# Patient Record
Sex: MALE | Race: WHITE
[De-identification: names, ages, dates, MRNs, and addresses within clinical notes are randomized per-mention and may not be internally consistent; named-entity substitution may affect disease eponyms.]

---

## 2017-08-22 NOTE — RAD
INDICATION: Chest pain



COMPARISON: Chest x-ray August 13, 2011

 

TECHNIQUE: Single AP portable view of the chest was obtained.



FINDINGS: 



Image quality is compromised due to the relative inferiority of a portable chest x-ray.



The heart and mediastinum exhibit normal size and contour.



The lungs are grossly clear. There is no evidence of a large pleural effusion.



Visualized bones are normal for the patient's age.



IMPRESSION:  No radiographic evidence for acute cardiopulmonary abnormality on this

portable chest x-ray.

## 2017-08-22 NOTE — RAD
INDICATION:  Headache and hypertension



COMPARISON: None.



TECHNIQUE: Contiguous axial sections of the brain were obtained from the skull base to the

vertex without contrast.



FINDINGS: The ventricles, cisterns and sulci are within normal limits.  The gray-white

matter differentiation is adequately maintained and there is no sulcal effacement. No

significant focal abnormality or mass effect is present. There is no evidence for

intracranial hemorrhage.



No significant focal osseous abnormality is present. 



The visualized portion of the paranasal sinuses and mastoid air cells appear clear.



IMPRESSION:  Normal CT of the brain.

## 2017-08-22 NOTE — ED
John MARIN Rebecca, scribed for William Varela MD on 08/22/17 at 1928 .





Hypertension





- HPI Summary


HPI Summary: 


Pt is a 51 y/o M who presents to ED c/o hypertension. When the pt took his BP 

PTA at [facility] it was 198/102. He then called his PCP and was advised to be 

evaluated by [facility] ED if his sx did not improve or experienced chest pressure. He 

then began experiencing chest pressure, which prompted his visit. He c/o 

bilateral LE heaviness, palpitations, chest pressure, fatigue (1 week) and 

feeling "off kilter." Pt reports that he has felt off balanced since waking up 

at approximately 0700 this morning. Denies SOB, abd pain, acute back pain, 

weakness and vision, speech or hearing changes. PMHx HTN. 





- History of Current Complaint


Chief Complaint: EDHypertension


Stated Complaint: HIGH BP


Time Seen by Provider: 08/22/17 19:20


Hx Obtained From: Patient


Onset/Duration: Still Present


Reported Blood Pressure Prior To Arrival: 198/102


Aggravating Factor(s): Nothing


Alleviating Factor(s): Nothing


Associated Signs & Symptoms: Other: - chest pressure, palpitations





- Allergies/Home Medications


Allergies/Adverse Reactions: 


 Allergies











Allergy/AdvReac Type Severity Reaction Status Date / Time


 


No Known Allergies Allergy   Verified 08/22/17 18:36














PMH/Surg Hx/FS Hx/Imm Hx


Endocrine/Hematology History: 


   Denies: Hx Diabetes, Hx Thyroid Disease


Cardiovascular History: Reports: Hx Hypertension


   Denies: Hx Pacemaker/ICD


Respiratory History: 


   Denies: Hx Asthma, Hx Chronic Obstructive Pulmonary Disease (COPD)


GI History: 


   Denies: Hx Ulcer


Sensory History: 


   Denies: Hx Hearing Aid


Neurological History: Reports: Hx Headaches


Psychiatric History: 


   Denies: Hx Panic Disorder





- Surgical History


Surgery Procedure, Year, and Place: hernia repair; vasectomy; wisdom teeth


Infectious Disease History: No


Infectious Disease History: 


   Denies: Hx Hepatitis, Hx Human Immunodeficiency Virus (HIV), History Other 

Infectious Disease, Traveled Outside the [place] in Last 30 Days





- Family History


Known Family History: Positive: Cardiac Disease, Hypertension, Diabetes





- Social History


Alcohol Use: Weekly


Substance Use Type: Reports: None


Smoking Status (MU): Former Smoker


Type: Cigarettes


Have You Smoked in the Last Year: No





Review of Systems


Positive: Fatigue, Other - Hypertensive - 198/102 PTA


Positive: Other - NEGATIVE: hearing changes


Positive: Other - NEGATIVE: hearing changes


Positive: Palpitations, Other - Chest pressure


Negative: Shortness Of Breath


Negative: Abdominal Pain


Positive: Other - Bilateral LE heaviness; NEGATIVE: acute back pain


Neurological: Other - Off balanced - "off kilter"; NEGATIVE: speech changes


Negative: Weakness


All Other Systems Reviewed And Are Negative: Yes





Physical Exam


Triage Information Reviewed: Yes


Vital Signs On Initial Exam: 


 Initial Vitals











Temp Pulse Resp BP Pulse Ox


 


 98.1 F   86   16   140/99   96 


 


 08/22/17 18:36  08/22/17 18:36  08/22/17 18:36  08/22/17 18:36  08/22/17 18:36











Appearance: Positive: Well-Appearing, No Pain Distress


Skin: Positive: Warm


Head/Face: Positive: Normal Head/Face Inspection


Eyes: Positive: EOMI


ENT: Positive: Normal ENT inspection


Respiratory/Lung Sounds: Positive: Clear to Auscultation, Breath Sounds Present


Cardiovascular: Positive: RRR.  Negative: Murmur


Abdomen Description: Positive: Nontender


Musculoskeletal: Positive: Strength/ROM Intact


Neurological: Positive: Sensory/Motor Intact, Alert, Oriented to Person Place, 

Time, CN Intact II-III


Psychiatric: Positive: Normal





- Harvey Coma Scale


Coma Scale Total: 15





Diagnostics





- Vital Signs


 Vital Signs











  Temp Pulse Resp BP Pulse Ox


 


 08/22/17 19:13  98.5 F  71  15  139/94  95


 


 08/22/17 19:00   66   133/91  


 


 08/22/17 18:47   72   153/91  


 


 08/22/17 18:46   74    96


 


 08/22/17 18:36  98.1 F  86  16  140/99  96














- Laboratory


Lab Results: 


 Lab Results











  08/22/17 08/22/17 08/22/17 Range/Units





  20:08 20:08 20:08 


 


WBC  6.5    (3.5-10.8)  10^3/ul


 


RBC  5.31    (4.0-5.4)  10^6/ul


 


Hgb  16.5    (14.0-18.0)  g/dl


 


Hct  47    (42-52)  %


 


MCV  89    (80-94)  fL


 


MCH  31    (27-31)  pg


 


MCHC  35    (31-36)  g/dl


 


RDW  13    (10.5-15)  %


 


Plt Count  180    (150-450)  10^3/ul


 


MPV  9    (7.4-10.4)  um3


 


Neut % (Auto)  59.0    (38-83)  %


 


Lymph % (Auto)  29.3    (25-47)  %


 


Mono % (Auto)  7.9    (1-9)  %


 


Eos % (Auto)  2.8    (0-6)  %


 


Baso % (Auto)  1.0    (0-2)  %


 


Absolute Neuts (auto)  3.8    (1.5-7.7)  10^3/ul


 


Absolute Lymphs (auto)  1.9    (1.0-4.8)  10^3/ul


 


Absolute Monos (auto)  0.5    (0-0.8)  10^3/ul


 


Absolute Eos (auto)  0.2    (0-0.6)  10^3/ul


 


Absolute Basos (auto)  0.1    (0-0.2)  10^3/ul


 


Absolute Nucleated RBC  0.01    10^3/ul


 


Nucleated RBC %  0.1    


 


INR (Anticoag Therapy)   0.94   (0.89-1.11)  


 


Sodium    140  (133-145)  mmol/L


 


Potassium    3.6  (3.5-5.0)  mmol/L


 


Chloride    107  (101-111)  mmol/L


 


Carbon Dioxide    26  (22-32)  mmol/L


 


Anion Gap    7  (2-11)  mmol/L


 


BUN    12  (6-24)  mg/dL


 


Creatinine    0.74  (0.67-1.17)  mg/dL


 


Est GFR ( Amer)    142.3  (>60)  


 


Est GFR (Non-Af Amer)    110.6  (>60)  


 


BUN/Creatinine Ratio    16.2  (8-20)  


 


Glucose    99  ()  mg/dL


 


Lactic Acid     (0.5-2.0)  mmol/L


 


Calcium    9.5  (8.6-10.3)  mg/dL


 


Total Bilirubin    0.80  (0.2-1.0)  mg/dL


 


AST    18  (13-39)  U/L


 


ALT    10  (7-52)  U/L


 


Alkaline Phosphatase    75  ()  U/L


 


Troponin I    0.00  (<0.04)  ng/mL


 


Total Protein    6.7  (6.4-8.9)  g/dL


 


Albumin    4.4  (3.2-5.2)  g/dL


 


Globulin    2.3  (2-4)  g/dL


 


Albumin/Globulin Ratio    1.9  (1-3)  














  08/22/17 Range/Units





  20:08 


 


WBC   (3.5-10.8)  10^3/ul


 


RBC   (4.0-5.4)  10^6/ul


 


Hgb   (14.0-18.0)  g/dl


 


Hct   (42-52)  %


 


MCV   (80-94)  fL


 


MCH   (27-31)  pg


 


MCHC   (31-36)  g/dl


 


RDW   (10.5-15)  %


 


Plt Count   (150-450)  10^3/ul


 


MPV   (7.4-10.4)  um3


 


Neut % (Auto)   (38-83)  %


 


Lymph % (Auto)   (25-47)  %


 


Mono % (Auto)   (1-9)  %


 


Eos % (Auto)   (0-6)  %


 


Baso % (Auto)   (0-2)  %


 


Absolute Neuts (auto)   (1.5-7.7)  10^3/ul


 


Absolute Lymphs (auto)   (1.0-4.8)  10^3/ul


 


Absolute Monos (auto)   (0-0.8)  10^3/ul


 


Absolute Eos (auto)   (0-0.6)  10^3/ul


 


Absolute Basos (auto)   (0-0.2)  10^3/ul


 


Absolute Nucleated RBC   10^3/ul


 


Nucleated RBC %   


 


INR (Anticoag Therapy)   (0.89-1.11)  


 


Sodium   (133-145)  mmol/L


 


Potassium   (3.5-5.0)  mmol/L


 


Chloride   (101-111)  mmol/L


 


Carbon Dioxide   (22-32)  mmol/L


 


Anion Gap   (2-11)  mmol/L


 


BUN   (6-24)  mg/dL


 


Creatinine   (0.67-1.17)  mg/dL


 


Est GFR ( Amer)   (>60)  


 


Est GFR (Non-Af Amer)   (>60)  


 


BUN/Creatinine Ratio   (8-20)  


 


Glucose   ()  mg/dL


 


Lactic Acid  0.6  (0.5-2.0)  mmol/L


 


Calcium   (8.6-10.3)  mg/dL


 


Total Bilirubin   (0.2-1.0)  mg/dL


 


AST   (13-39)  U/L


 


ALT   (7-52)  U/L


 


Alkaline Phosphatase   ()  U/L


 


Troponin I   (<0.04)  ng/mL


 


Total Protein   (6.4-8.9)  g/dL


 


Albumin   (3.2-5.2)  g/dL


 


Globulin   (2-4)  g/dL


 


Albumin/Globulin Ratio   (1-3)  











Result Diagrams: 


 08/22/17 20:08





 08/22/17 20:08


Lab Statement: Any lab studies that have been ordered have been reviewed, and 

results considered in the medical decision making process.





- Radiology


  ** CXR


Xray Interpretation: No Acute Changes - No radiographic evidence for acute 

cardiopulmonary abnormality on this portable chest x-ray.


Radiology Interpretation Completed By: Radiologist





- CT


  ** Brain CT


CT Interpretation: No Acute Changes - Normal CT of the brain.


CT Interpretation Completed By: Radiologist





- EKG


  ** 1851


Cardiac Rate: NL - 74 bpm


EKG Rhythm: Sinus Rhythm


EKG Interpretation: No acute changes





Re-Evaluation





- Re-Evaluation


  ** First Eval


Re-Evaluation Time: 20:19


Comment: Pt is doing well, reports a very slight pressure in the chest.





Hypertension Course/Dx





- Course


Course Of Treatment: 52 yr old male with chest pain and elevated bp.  DW Dr Frankenberg. Admit for further eval obs.


Assessment/Plan: Elevated BP noted and advised to f/u with PCP.





- Diagnoses


Provider Diagnoses: 


 Chest pain








- Physician Notifications


Discussed Care Of Patient With: Fred Frankenberg


Time Discussed With Above Provider: 20:48


Instructed by Provider To: Other - Accepts pt for admission.





Discharge





- Discharge Plan


Condition: Good


Disposition: ADMITTED TO [facility]





The documentation as recorded by the John do Rebecca accurately 

reflects the service I personally performed and the decisions made by me, William Varela MD.

## 2017-08-23 NOTE — RAD
Edited for charges.



INDICATION: Chest pain.



COMPARISON: No relevant prior exams available on the [facility] PACS for comparison.



TECHNIQUE: 10.860 mCi of Tc-99m Myoview were administered IV. SPECT images of 
the heart

were obtained.



Later on the same day. Under the direction of Dr. Omer, an exercise stress 
test was

performed. The patient achieved a peak heart rate of 150 bpm, 90 % of the age-
predicted

maximum. Subsequently, the patient was given an IV injection of 25.800 mCi Tc-
99m Myoview.

SPECT images of the heart were obtained and a gated wall motion study was 
performed.



FINDINGS:  Gated wall motion images were obtained at stress and demonstrate 
very mild

septal and inferior wall hypokinesia.



The calculated left ventricular ejection fraction is 58 % at stress. Estimated 
LEFT

ventricular end diastolic volume is 85 mL. TID 0.92.   



Diaphragmatic attenuation noted. Based on review of the attenuation corrected 
and non

corrected images the distribution  of radiopharmaceutical within the myocardium 
on the

stress and rest images is within normal limits. No fixed or reversible regions 
of

hypoperfusion evident.



IMPRESSION: 

1. Very mild septal and inferior wall hypokinesia. The LEFT ventricular 
ejection fraction

remains within normal range.

2. No compelling evidence for stress-induced ischemia or presence of an infarct.



ASSESSMENT:  LOW RISK.



Based on imaging criteria from ACC/AHA 2002 Guideline Update for the Management 
of

Patients With Chronic Stable Angina Table 23. Noninvasive Risk Stratification.



MTDD

## 2017-08-23 NOTE — HP
HISTORY AND PHYSICAL:

 

DATE OF ADMISSION:  08/22/17

 

PRIMARY CARE PROVIDER:  None.

 

ATTENDING PHYSICIAN WHILE IN THE HOSPITAL:  Dr. Fred Frankenberg * (report 
dictated by Nikita Watson NP).

 

CHIEF COMPLAINT:

1.  Chest pain

2.  Elevated blood pressure.

 

HISTORY OF PRESENT ILLNESS:  Mr. Lucero is a 52-year-old male patient who has 
a history of recent diagnosis of hypertension, ADHD, and history of chronic 
back pain.  He comes in, he says he noticed in June in his urology appointment 
his blood pressures were in the 170s.  He wished to seek follow up with his 
primary; unfortunately, his primary retired and he had not had a chance to seek 
care with a new primary.  He was at [facility] today at the pharmacy, he thought 
he would check his blood pressure and it was noted to be 198/107.  He called 
[facility] to try to establish today.  They actually got him an appointment for 
tomorrow and the patient was instructed that should he start having any other 
discomfort, headache, chest pain, he should be evaluated immediately at the ER.
  Around 3 o'clock today, he started having some chest pressure.  He described 
it as tightness, someone sitting on his chest that lasted several minutes.  He 
was concerned and he started getting headache.  He came into the ER to be 
evaluated for this.  He denies having any recent trips or travel.  No calf pain
, leg pain.  No swelling or tenderness.  He denied having any associated nausea 
or vomiting, no diaphoresis.  He says that the headache is now gone and the 
chest pain is now gone that his blood pressure is little bit better controlled.
  He says he has not been having any other symptoms, no trouble or difficulty 
with urination.  He said that he has had heaviness with exertion.  He came in, 
he was evaluated, it was noted that he has a former history of smoking.  In 
addition to this, he had a family history, so we were asked to evaluate for 
admission.

 

PAST MEDICAL HISTORY:  Significant for:

1.  Hypertension.

2.  ADHD.

3.  Chronic back pain.

 

PAST SURGICAL HISTORY:  He has had:

1.  Hernia repair.

2.  Vasectomy.

 

MEDICATIONS:  His home meds according to the list include:

1.  Klonopin 0.5 mg p.o. daily as needed.

2.  Oxcarbazepine 600 mg p.o. b.i.d.

 

ALLERGIES TO MEDICATIONS:  Include no known drug allergies.

 

FAMILY HISTORY:  Mother had a history of MI, CAD, CVA, and hyperlipidemia.  The 
father had history of cancer.

 

SOCIAL HISTORY:  He is a former smoker, rarely drinks alcohol.  His surrogate 
decision maker is his significant other, Sangita.

 

REVIEW OF SYSTEMS:  There is no documented fever.  He denied having any 
significant weight change.  There was no double vision.  He denies having any 
ear discharge. There is no rhinorrhea.  There is no sore throat.  No thyroid 
enlargement.  He denies chest pain currently.  He did have some from my HPI.  
No shortness of breath, no orthopnea, no nocturnal dyspnea.  There was no 
abdominal pain.  No nausea, no vomiting.  No dysuria, no frequency.  There was 
no loss of consciousness.  No pruritus and no skin ulcerations.  Review of 14 
systems completed, all others negative.

 

                               PHYSICAL EXAMINATION

 

GENERAL:  At this time, Mr. Lucero is a 52-year-old male patient.  He appears 
to be well nourished, well developed.  He does not appear to be in any acute 
distress.

 

VITAL SIGNS:  Reveal blood pressure 137/88, pulse 61, respirations 15, O2 sat 95
%, temperature 98.5.

 

HEENT:  Head:  Atraumatic, normocephalic.  Eyes:  EOMs are intact.  Sclerae 
anicteric.  Throat:  Oral mucosa appears to be moist.  No oropharyngeal 
erythema.

 

NECK:  Supple.

 

LUNGS:  Clear to auscultation.  No wheezes, rales, or rhonchi.

 

HEART:  Sounds S1, S2.  Regular rate and rhythm.  No murmurs, rubs, or gallops.

 

ABDOMEN:  Soft, flat, nontender.  Bowel sounds present.

 

EXTREMITIES:  Pulses 2+ throughout.  Able to move all 4 extremities with 5/5 
strength.

 

NEUROLOGIC:  The patient is awake, alert, and oriented x3.  Tongue midline.  
 equal.  No gross focal deficits.

 

SKIN:  Grossly intact.

 

 DIAGNOSTIC STUDIES/LAB DATA:  Labs revealed WBC 6.5, RBC of 5.31, hemoglobin 
16.5, hematocrit 47, platelet count 180.  INR 0.94.  Sodium 140, potassium 3.6, 
chloride of 107, bicarb 26, BUN 12, creatinine 0.74, glucose 99, lactate 0.6, 
calcium 9.5. Total bili 0.8, AST 18, ALT 10, alk phos 75.  Troponin 0.  Albumin 
4.4.

 

He did have an EKG obtained today, which showed normal sinus rhythm with a rate 
of 74.  No ST elevations or T-wave inversions were noted.

 

He had a chest x-ray obtained today, which showed no radiographic evidence of 
acute cardiopulmonary abnormality on this portable x-ray.

 

Brain CT showed a normal CT of the brain.

 

EKG is reviewed.  His previous EKG does appear to be similar.  Old medical 
records reviewed.

 

ASSESSMENT AND PLAN:  Mr. Lucero is a 52-year-old male patient coming into 
the ER today with complaints of chest discomfort, not feeling well, and having 
elevated blood pressure.  He will be admitted under observation status for:

 

1.  Chest pain.  At this point, he does have significant risk factors for 
coronary artery disease.  My plan is, I will go ahead and give him an aspirin.  
His chest pain is gone now.  I am actually going to remove his nitro paste; if 
it comes back, we will reapply the paste.  He did get a beta blocker here in 
ED.  We will continue to monitor him, cycle his troponins, we will check a 
stress test in the morning, place him on telemetry, serial EKGs, check an A1c, 
and lipid panel in the morning.

2.  Hypertension.  It is not well controlled at this point.  He will need a 
primary to follow.  I think starting him on Norvasc will be a good choice, as 
he is very active, I do not want to start him on a beta blocker.  Could 
consider hydrochlorothiazide, but again I am going to stick with Norvasc at 
this point for the patient and start this tomorrow and I will continue to 
monitor.

3.  Attention deficit hyperactivity disorder.  Continue meds as prescribed.

4.  Chronic back pain.  Continue meds as prescribed.  P.r.n. Tylenol has been 
ordered.

5.  DVT prophylaxis.  He is lower risk.  He will be placed on SCDs.

6.  Code status.  He is full code.

7.  Fluids, electrolytes, and nutrition.  Heart healthy diet and n.p.o. after 
midnight.

 

TIME SPENT:  On the admission was approximately 60 minutes, greater than half 
the time was spent face-to-face with the patient obtaining my history and 
physical; the other half time was spent going over the plan of care with the 
patient and implementing the plan of care.

 

I did discuss the plan of care with my attending, Dr. Frankenberg; he is in 
agreement.

 

 ____________________________________ NIKITA WATSON, NATHALY

 

[id number]/CPS #: [id number]

[facility]

## 2017-08-23 NOTE — DCNOTE
Subjective


Date of Service: 08/23/17


Interval History: 





No more chest discomfort.  He usually walks for an hour a few times a week, 

never had chest discomfort while walking.  Quit smoking 25 yrs ago.  





Objective


Active Medications: 








Acetaminophen (Tylenol Tab*)  650 mg PO Q4H PRN


   PRN Reason: FEVER/PAIN


Aspirin (Aspirin Low Dose Tab*)  81 mg PO DAILY [facility]


   Last Admin: 08/23/17 13:54 Dose:  81 mg


Ondansetron HCl (Zofran Inj*)  4 mg IV Q6H PRN


   PRN Reason: NAUSEA


Oxcarbazepine (Trileptal Tab(*))  600 mg PO BID [facility]


   Last Admin: 08/23/17 13:54 Dose:  600 mg








 Vital Signs











  08/22/17 08/22/17 08/23/17





  21:30 22:07 00:09


 


Temperature  98.4 F 97.9 F


 


Pulse Rate 66 58 62


 


Respiratory 16 18 16





Rate   


 


Blood Pressure 132/82 126/89 126/76





(mmHg)   


 


O2 Sat by Pulse 93 94 98





Oximetry   














  08/23/17 08/23/17 08/23/17





  04:06 07:33 11:11


 


Temperature 98.1 F 98.3 F 98.2 F


 


Pulse Rate 59 62 51


 


Respiratory 16 16 16





Rate   


 


Blood Pressure 127/75 137/83 122/75





(mmHg)   


 


O2 Sat by Pulse 97 95 96





Oximetry   











Appearance: Alert, sitting on the edge of his bed.  In good spirits.  Looks 

comfortable.


Neck: NL Appearance and Movements; NL JVP, No Thyroid Enlargement, Masses


Extremities: No Edema, No Clubbing, Cyanosis, -


Skin: No Rash or Ulcers, No Nodules or Sclerosis, -


Neurological: Alert and Oriented x 3, NL Sensation


Result Diagrams: 


 08/23/17 04:41





 08/23/17 04:41


Additional Lab and Data: 


 Lab Results











  08/22/17 08/22/17 08/22/17 Range/Units





  20:08 20:08 20:08 


 


WBC  6.5    (3.5-10.8)  10^3/ul


 


RBC  5.31    (4.0-5.4)  10^6/ul


 


Hgb  16.5    (14.0-18.0)  g/dl


 


Hct  47    (42-52)  %


 


MCV  89    (80-94)  fL


 


MCH  31    (27-31)  pg


 


MCHC  35    (31-36)  g/dl


 


RDW  13    (10.5-15)  %


 


Plt Count  180    (150-450)  10^3/ul


 


MPV  9    (7.4-10.4)  um3


 


Neut % (Auto)  59.0    (38-83)  %


 


Lymph % (Auto)  29.3    (25-47)  %


 


Mono % (Auto)  7.9    (1-9)  %


 


Eos % (Auto)  2.8    (0-6)  %


 


Baso % (Auto)  1.0    (0-2)  %


 


Absolute Neuts (auto)  3.8    (1.5-7.7)  10^3/ul


 


Absolute Lymphs (auto)  1.9    (1.0-4.8)  10^3/ul


 


Absolute Monos (auto)  0.5    (0-0.8)  10^3/ul


 


Absolute Eos (auto)  0.2    (0-0.6)  10^3/ul


 


Absolute Basos (auto)  0.1    (0-0.2)  10^3/ul


 


Absolute Nucleated RBC  0.01    10^3/ul


 


Nucleated RBC %  0.1    


 


INR (Anticoag Therapy)   0.94   (0.89-1.11)  


 


Sodium    140  (133-145)  mmol/L


 


Potassium    3.6  (3.5-5.0)  mmol/L


 


Chloride    107  (101-111)  mmol/L


 


Carbon Dioxide    26  (22-32)  mmol/L


 


Anion Gap    7  (2-11)  mmol/L


 


BUN    12  (6-24)  mg/dL


 


Creatinine    0.74  (0.67-1.17)  mg/dL


 


Est GFR ( Amer)    142.3  (>60)  


 


Est GFR (Non-Af Amer)    110.6  (>60)  


 


BUN/Creatinine Ratio    16.2  (8-20)  


 


Glucose    99  ()  mg/dL


 


Lactic Acid     (0.5-2.0)  mmol/L


 


Calcium    9.5  (8.6-10.3)  mg/dL


 


Total Bilirubin    0.80  (0.2-1.0)  mg/dL


 


AST    18  (13-39)  U/L


 


ALT    10  (7-52)  U/L


 


Alkaline Phosphatase    75  ()  U/L


 


Troponin I    0.00  (<0.04)  ng/mL


 


Total Protein    6.7  (6.4-8.9)  g/dL


 


Albumin    4.4  (3.2-5.2)  g/dL


 


Globulin    2.3  (2-4)  g/dL


 


Albumin/Globulin Ratio    1.9  (1-3)  














  08/22/17 Range/Units





  20:08 


 


WBC   (3.5-10.8)  10^3/ul


 


RBC   (4.0-5.4)  10^6/ul


 


Hgb   (14.0-18.0)  g/dl


 


Hct   (42-52)  %


 


MCV   (80-94)  fL


 


MCH   (27-31)  pg


 


MCHC   (31-36)  g/dl


 


RDW   (10.5-15)  %


 


Plt Count   (150-450)  10^3/ul


 


MPV   (7.4-10.4)  um3


 


Neut % (Auto)   (38-83)  %


 


Lymph % (Auto)   (25-47)  %


 


Mono % (Auto)   (1-9)  %


 


Eos % (Auto)   (0-6)  %


 


Baso % (Auto)   (0-2)  %


 


Absolute Neuts (auto)   (1.5-7.7)  10^3/ul


 


Absolute Lymphs (auto)   (1.0-4.8)  10^3/ul


 


Absolute Monos (auto)   (0-0.8)  10^3/ul


 


Absolute Eos (auto)   (0-0.6)  10^3/ul


 


Absolute Basos (auto)   (0-0.2)  10^3/ul


 


Absolute Nucleated RBC   10^3/ul


 


Nucleated RBC %   


 


INR (Anticoag Therapy)   (0.89-1.11)  


 


Sodium   (133-145)  mmol/L


 


Potassium   (3.5-5.0)  mmol/L


 


Chloride   (101-111)  mmol/L


 


Carbon Dioxide   (22-32)  mmol/L


 


Anion Gap   (2-11)  mmol/L


 


BUN   (6-24)  mg/dL


 


Creatinine   (0.67-1.17)  mg/dL


 


Est GFR ( Amer)   (>60)  


 


Est GFR (Non-Af Amer)   (>60)  


 


BUN/Creatinine Ratio   (8-20)  


 


Glucose   ()  mg/dL


 


Lactic Acid  0.6  (0.5-2.0)  mmol/L


 


Calcium   (8.6-10.3)  mg/dL


 


Total Bilirubin   (0.2-1.0)  mg/dL


 


AST   (13-39)  U/L


 


ALT   (7-52)  U/L


 


Alkaline Phosphatase   ()  U/L


 


Troponin I   (<0.04)  ng/mL


 


Total Protein   (6.4-8.9)  g/dL


 


Albumin   (3.2-5.2)  g/dL


 


Globulin   (2-4)  g/dL


 


Albumin/Globulin Ratio   (1-3)  














Assess/Plan/Problems-Billing


Assessment: 











- Patient Problems


(1) Atypical chest pain


Current Visit: Yes   Status: Acute   Code(s): R07.89 - OTHER CHEST PAIN   

SNOMED Code(s): 615746253


   Comment: Patient to followup with new PCP.  I explained the process of 

evaluating an outpatient for the diagnosis of hypertension.  He prefers to wait 

on anti-hypertensive medicine until a definitive diagnosis is made.    





(2) ADHD


Current Visit: Yes   Status: Acute   Comment: Continue home med.

## 2017-08-24 NOTE — DS
CC:  Dr. Santiago *

 

DISCHARGE SUMMARY:

 

DATE OF ADMISSION:

 

DATE OF DISCHARGE:  08/23/17

 

HISTORY OF PRESENT ILLNESS: This 52-year-old man presented with atypical chest 
pain and elevated blood pressure.  He had a feeling of malaise the day of 
admission.  Recently had been for routine visit at the urologist office and was 
told his blood pressure was high, he did not have the number.  He said all his 
life, whenever he had been to a doctor's office, says his blood pressure has 
always been good.  As mentioned above, the patient had some malaise.  He went 
to [facility] to get his prescription for his usual ADHD medicine.  He has blood 
pressure measured there and got blood pressure of 198/107.  According to the 
record here, his blood pressure may have been in the 170s in June in the 
Urology office.  He was advised over the phone that if he had headache or chest 
pain, he should go to the emergency room.  About few hours after that, he 
developed headache and chest pressure and went to the emergency room.

 

He was monitored on the telemetry unit.  His EKG was within normal limits.  He 
had 3 troponin levels, all of which were normal.  He underwent a nuclear stress 
test on the day of discharge.  This showed a normal left ventricular ejection 
fraction and no fixed or reversible areas of hypoperfusion.  He had no further 
symptoms in the hospital.  He did get doses of oral metoprolol and some nitro 
paste in the emergency room.  His blood pressure the second hospital morning 
was 137/83.  At 11 o'clock, it was 122/75.  Following that, he had a stress 
test at which time he says his blood pressures were little high and following 
that on return to the medical floor, he received amlodipine 5 mg.

 

I discussed with the patient the process of evaluating in outpatient for the 
diagnosis of hypertension.  He prefers not to take any antihypertensive 
medication until a definitive diagnosis has been made.  He has an appointment 
with Dr. Santiago on 09/08/17, which we made. 



FINAL DIAGNOSES:

1.  Atypical chest pain.

2.  Attention deficit hyperactivity disorder.

 

DISCHARGE MEDICATIONS:

1.  Oxcarbazepine 600 mg b.i.d.

2.  Klonopin 0.5 mg p.r.n.

 

 [id number]/[facility] #: [id number]

[facility]

## 2017-12-24 NOTE — RAD
Indication: Shortness of breath.



2 views of the chest including dual energy PA views demonstrate no mediastinal shift.

Heart is of normal size and configuration. Lung fields are clear.



IMPRESSION: No active cardiopulmonary disease is noted.

## 2017-12-24 NOTE — RAD
Indication: Hypoxia, shortness of breath.



Contrast: Administered 82.1 ml of OMNIPAQUE 350 mg/ml



CTA of the chest was performed after IV contrast administration. Coronal and sagittal

reconstructed images were obtained.



The pulmonary arterial tree demonstrates no evidence of filling defects to suggest

pulmonary embolus. The aorta demonstrates no evidence of aortic dissection.



No aneurysmal dilatation is noted. There is no mediastinal or hilar adenopathy.



The trachea and major bronchi appear patent. There is airspace disease in the right

suprahilar area of the right lower lobe which may represent pneumonia. Consolidation is

noted. No focal nodules are identified.



The visualized abdominal organs are otherwise unremarkable.



IMPRESSION: No definite evidence of pulmonary embolus is noted.



Area of consolidation in the right upper lobe posteriorly.

## 2017-12-25 NOTE — PN
Progress Note





- Progress Note


Date of Service: 12/25/17


Note: 





Discharge Progress Note





Primary diagnosis:


right upper lobe pneumonia





Secondary diagnoses:


hypertension


ADHD


chronic neck/back pain





Procedures:


none





Consultations:


none





Pertinent laboratory/radiology testing:


CTA chest: small RUL infiltrate





Microbiology





12/24/17 17:36   Urine   Legionella Urinary Antigen - Final


12/24/17 17:36   Urine   Streptococcus pneumoniae Ag Screen - Final


                              Negative Legionella


                              Negative S. pneumo Antigen





 Laboratory Tests











  12/24/17





  17:19


 


Influenza A (Rapid)  Negative











Tests pending upon discharge:


none





Physical exam: 











  12/25/17 12/25/17





  07:27 08:48


 


Temperature 36.8 C 


 


Pulse Rate 80 


 


Respiratory 16 





Rate  


 


Blood Pressure 129/90 





(mmHg)  


 


O2 Sat by Pulse 95 93





Oximetry

## 2017-12-25 NOTE — HP
CC:  Dr. Paige *

 

HISTORY AND PHYSICAL:

 

DATE OF ADMISSION:  12/24/17

 

PRIMARY CARE PROVIDER:  Dr. Paige.

 

ATTENDING PHYSICIAN WHILE IN THE HOSPITAL:  Dr. Joaquin Matos * (report 
dictated by Nikita Lindsay NP)

 

CHIEF COMPLAINT:

1.  Cough.

2.  Shortness of breath.

3.  Not feeling well.

 

HISTORY OF PRESENT ILLNESS:  Mr. Lucero is a 52-year-old male patient who 
comes into the ED today.  He says that since Thursday last week, he just has 
not been feeling well.  He has had a cough, productive of yellow-type sputum.  
He says when he lies flat at night, he has been feeling more short of breath, 
has been waking up at the middle of the night gasping for air.  He says that he 
has been tired, weak, fatigued feeling.  He says had kind of aching all over 
and feeling nauseous.  He also has been feeling dizzy.  He has had decreased 
appetite.  Denied any nausea, vomiting.  He went to his primary, he was started 
on azithromycin.  He was not feeling better.  He called his primary today and 
they referred him to the ER to get a further imaging with an x-ray.  He denies 
having any chest pain with the exception when he is coughing.  He denies having 
any reported fevers, but he does say that he has been feeling chills.  He says 
that he just was not getting any better, so he came in today.  He was evaluated 
here in the ED.  It was noted his O2 saturations were right around 92%.  There 
was concern that he may have bronchitis and it was noted that he was wheezing 
on exam, so we were asked to evaluate for admission.

 

PAST MEDICAL HISTORY:  Significant for:

1.  Hypertension.

2.  ADHD.

3.  Chronic back pain.

 

PAST SURGICAL HISTORY:  He has had:

1.  Hernia repair.

2.  Vasectomy.

 

MEDICATIONS:  Home meds include:

1.  Oxcarbazepine 600 mg p.o. b.i.d.

2.  Klonopin 0.5 mg p.o. daily as needed.

 

ALLERGIES TO MEDICATIONS:  No known drug allergies.

 

FAMILY HISTORY:  Mother had a history of MI, CAD, and CVA.  Father had history 
of cancer.

 

SOCIAL HISTORY:  He is a former smoker, he quit 35 years ago.  Rarely drinks 
alcohol.  His surrogate decision maker is his girlfriend, Sangita.

 

REVIEW OF SYSTEMS:  There is no documented fever here.  He has _____ to having 
chills.  He denies have any double vision.  There is no ear discharge.  There 
was some rhinorrhea.  Denies any congestion.  He does admit to having a cough.  
He does state that he has been aching.  He does admit to feeling nauseous now.  
Denies having any abdominal pain.  Denies having any loss of consciousness.  No 
pruritus and no skin ulcerations.  Review of 14 systems completed, all others 
negative.

 

                               PHYSICAL EXAMINATION

 

GENERAL:  At this time, Mr. Lucero is a 52-year-old male patient.  He appears 
to be well nourished, well developed.  He is sitting in the ED stretcher.  He 
does not appear to be in any acute distress.

 

VITAL SIGNS:  Blood pressure 152/91, pulse 82, respirations 18, O2 sat 94%, 
temperature 99.

 

HEENT:  Head:  Atraumatic, normocephalic.  Eyes:  EOMs are intact.  Sclerae 
anicteric, not pale.  Throat:  Oral mucosa appears to be moist.  No 
oropharyngeal erythema.

 

NECK:  Supple.

 

LUNGS:  He did have some wheezing noted in the lower base, particularly on the 
right side.  He did have rhonchi in the upper lobes.  Equal diaphragmatic 
expansion.

 

HEART:  Sounds S1, S2.  Regular rate and rhythm.  No murmurs, rubs, or gallops.

 

ABDOMEN:  Soft, flat, nontender.  Bowel sounds present.

 

EXTREMITIES:  Pulses 2+ throughout.  No peripheral edema.  He is moving all 4 
extremities with 5/5 strength.

 

NEUROLOGIC:  He is awake, alert, oriented x3.  No gross focal deficits.

 

SKIN:  Intact.

 

 DIAGNOSTIC STUDIES/LAB DATA:  WBC 5.4, RBC of 5.09, hemoglobin 16.0, 
hematocrit 45, platelet count 187.  Sodium 135, potassium 3.9, chloride 103, 
bicarb 24, BUN 8, creatinine 0.77, glucose 159, lactate 1.1, calcium 9.  Total 
bili 1.1, AST 20, ALT 10, alk phos 82.  Troponin 0.

 

He did have an EKG obtained today, showed normal sinus rhythm, rate of 80.  No 
ST-T elevations or T-wave inversions were noted.

 

Chest x-ray obtained today, impression:  No active cardiopulmonary disease.  
Old medical records reviewed.

 

ASSESSMENT AND PLAN:  Mr. Lucero is a 52-year-old coming into the ER today 
with complaints of upper respiratory infection symptoms.  We were asked to 
evaluate for admission.  He will be admitted under observations status for:

 

1.  Upper respiratory infection.  Suspect he probably has a viral bronchitis.  
His flu swab is pending.  It was sent down by the ED, just came back.  It is 
negative. Flu is negative.  The patient was noted to be little hypoxic where 
his O2 sats were right around 90% on room air.  He was not feeling well.  He 
started getting dizzy down here, feeling nauseous again after treatment, and we 
were asked to evaluate. I think he does _____ for the bronchitis.  I think we 
will go ahead and give him fluids, nebs, steroids, put him on azithromycin.  We 
will continue that antibiotics for now that was started outpatient and we will 
continue with supportive care.  He does report having an exposure to asbestos 
with his job, so I am going to get a CTA of the chest and will continue to 
follow him.

2.  Hypertension.  Continue his current medical regimen.  His blood pressure is 
stable here.  We will add on an agent.

3.  Attention deficit hyperactivity disorder.  Continue meds as prescribed.

4.  Chronic back pain.  Continue meds as prescribed.  He has p.r.n. Tylenol 
available.

5.  Code status.  Full code.

6.  Fluids, electrolytes, and nutrition:  He can have a regular diet.

 

TIME SPENT:  On the admission was 60 minutes, greater than half the time was 
spent face-to-face with the patient, obtaining my history and physical, the 
other half of the time was spent going over the plan of care with the patient 
and implementing plan of care.

 

I did discuss the plan of care with my attending, Dr. Matos; he is in 
agreement.

 

 ____________________________________ NIKITA LINDSAY, NATHALY

 

[id number]/[facility] #: [id number]

SHIELA

## 2017-12-26 NOTE — DS
CC:  Bessie Kilgore MD.

 

DISCHARGE SUMMARY:

 

DATE OF ADMISSION:  12/24/17

 

DATE OF DISCHARGE:  12/25/17

 

PRIMARY DIAGNOSIS:  Right upper lobe pneumonia.

 

SECONDARY DIAGNOSES:

1.  Hypertension.

2.  Attention deficit hyperactivity disorder.

3.  Chronic neck and back pain.

 

MEDICATIONS ON DISCHARGE:

1.  Azithromycin 250 mg p.o. day for 4 days.

2.  Cefuroxime 5 mg p.o. b.i.d. for 6 days.

3.  Naproxen 500 mg p.o. b.i.d. p.r.n. for neck pain.

4.  Klonopin 0.5 mg p.o. b.i.d. p.r.n.

5.  Oxcarbazepine 600 mg p.o. b.i.d.

6.  Prednisone 20 mg p.o. q. day for 2 days and then 10 mg p.o. q. day for 2 
days and then stop.

 

HOSPITAL COURSE:  This 52-year-old man was admitted with dyspnea and cough.  
The patient's initial chest x-ray was negative but due to severity of his 
symptoms, he had chest CT angiogram which showed no pulmonary embolism but did 
show a small area of infiltrate in the right suprahilar area of the right lower 
lobe.  The patient was treated with ceftriaxone and azithromycin for community-
acquired pneumonia.  He responded quickly and the next day he was tolerating 
p.o. food and liquid and ambulatory without any dyspnea.  He did not require 
oxygen.  Due to his sensation of dyspnea despite absence of wheezing, he was 
started on prednisone 60 mg p.o. q. day after a dose of Solu-Medrol in the ER.  
The patient had some irritability and tremors and tingling in his hands that 
was attributed to prednisone.  The patient denies any history of bipolar 
disorder and states he has ADHD and takes Trileptal for that diagnosis.  However
, the patient appeared a little bit manic potentially from the prednisone.  In 
any case, the patient is discharged and is  advised to follow up with his 
primary care doctor, Dr. Kilgore.  The prednisone was lowered to 20 mg on 
discharge and he should receive this as outlined above.  He is advised to see 
his primary care doctor within the next week. 



DISPOSITION:  To home with his wife.

 

ACTIVITY:  As tolerated.

 

DIET:  Regular diet.

 

 [id number]/[facility] #: [id number]

[facility]

## 2020-03-06 ENCOUNTER — HOSPITAL ENCOUNTER (EMERGENCY)
Dept: HOSPITAL 25 - UCEAST | Age: 55
Discharge: HOME | End: 2020-03-06
Payer: COMMERCIAL

## 2020-03-06 VITALS — DIASTOLIC BLOOD PRESSURE: 92 MMHG | SYSTOLIC BLOOD PRESSURE: 129 MMHG

## 2020-03-06 DIAGNOSIS — L03.012: Primary | ICD-10-CM

## 2020-03-06 DIAGNOSIS — Z87.891: ICD-10-CM

## 2020-03-06 PROCEDURE — 90471 IMMUNIZATION ADMIN: CPT

## 2020-03-06 PROCEDURE — 90715 TDAP VACCINE 7 YRS/> IM: CPT

## 2020-03-06 PROCEDURE — G0463 HOSPITAL OUTPT CLINIC VISIT: HCPCS

## 2020-03-06 PROCEDURE — 99212 OFFICE O/P EST SF 10 MIN: CPT

## 2020-03-06 NOTE — UC
Skin Complaint HPI





- HPI Summary


HPI Summary: 





54 yo male presents with LEFT thumb pain/swelling. He tells me that about 3 

weeks ago he accidentally sliced his left thumb - did not seek treatment and 

area healed well. Last night noticed redness, mild pain, and swelling to base 

of left thumbnail. Concerned today for infection. Unsure date of last tetanus. 

Denies fever. 





- History of Current Complaint


Chief Complaint: UCUpperExtremity


Time Seen by Provider: 03/06/20 14:03


Stated Complaint: POSSIBLE INFECTION IN THUMB


Hx Obtained From: Patient


Onset/Duration: Sudden Onset


Onset Severity: Mild


Current Severity: Mild


Pain Intensity: 2


Pain Scale Used: 0-10 Numeric





- Allergy/Home Medications


Allergies/Adverse Reactions: 


 Allergies











Allergy/AdvReac Type Severity Reaction Status Date / Time


 


No Known Allergies Allergy   Verified 02/21/20 11:54











Home Medications: 


 Home Medications





Cephalexin CAP* [Keflex CAP*] 500 mg PO TID #21 cap 03/06/20 [Rx]











PMH/Surg Hx/FS Hx/Imm Hx





- Additional Past Medical History


Additional PMH: 





None





- Surgical History


Surgical History: Yes


Surgery Procedure, Year, and Place: RT INGUINAL HERNIA REPAIR, VASECTOMY





- Family History


Known Family History: Positive: Cardiac Disease, Hypertension, Diabetes





- Social History


Lives: With Family


Alcohol Use: Rare


Alcohol Amount: Weekends


Substance Use Type: None


Smoking Status (MU): Former Smoker


Type: Cigarettes


Have You Smoked in the Last Year: No


When Did the Patient Quit Smoking/Using Tobacco: 25 years ago





Review of Systems


All Other Systems Reviewed And Are Negative: No


Constitutional: Positive: Negative


Skin: Positive: Other - Left thumb redness


Respiratory: Positive: Negative


Cardiovascular: Positive: Negative


Neurovascular: Positive: Negative


Neurological/Mental Status: Positive: Negative


Psychological: Positive: Negative





Physical Exam





- Summary


Physical Exam Summary: 





GENERAL: NAD. WDWN. No pain distress.


SKIN: LEFT THUMB: Base of nail with mild erythema, edema, and ttp. No drainage, 

streaking, or induration. No fluctuant matter appreciated. 


CHEST:  No accessory muscle use. Breathing comfortably and in no distress.


CV:  Pulses intact. Cap refill <2seconds


MSK: FROM left thumb. 


NEURO: Alert.


PSYCH: Age appropriate behavior.





Triage Information Reviewed: Yes


Vital Signs: 


 Initial Vital Signs











Temp  97.2 F   03/06/20 13:14


 


Pulse  81   03/06/20 13:14


 


Resp  18   03/06/20 13:14


 


BP  129/92   03/06/20 13:14


 


Pulse Ox  97   03/06/20 13:14











Vital Signs Reviewed: Yes





Course/Dx





- Course


Course Of Treatment: 





tdap updated today.


Previous laceration appears to be completely healed with only faint linear scar.


Paronychia at base of thumb - rx for keflex and advised warm soaks. 





- Diagnoses


Provider Diagnosis: 


 Paronychia








Discharge ED





- Sign-Out/Discharge


Documenting (check all that apply): Patient Departure


All imaging exams completed and their final reports reviewed: No Studies





- Discharge Plan


Condition: Stable


Disposition: HOME


Prescriptions: 


Cephalexin CAP* [Keflex CAP*] 500 mg PO TID #21 cap


Patient Education Materials:  Paronychia (ED)


Referrals: 


Bessie Kilgore MD [Primary Care Provider] - 


Additional Instructions: 


If you develop a fever, shortness of breath, chest pain, new or worsening 

symptoms - please call your PCP or go to the ED immediately.


 


Apply a warm compress and/or warm salt water soaks a few times a day











- Billing Disposition and Condition


Condition: STABLE


Disposition: Home